# Patient Record
Sex: FEMALE | URBAN - METROPOLITAN AREA
[De-identification: names, ages, dates, MRNs, and addresses within clinical notes are randomized per-mention and may not be internally consistent; named-entity substitution may affect disease eponyms.]

---

## 2024-03-26 ENCOUNTER — LAB REQUISITION (OUTPATIENT)
Dept: LAB | Facility: CLINIC | Age: 29
End: 2024-03-26
Payer: COMMERCIAL

## 2024-03-26 LAB
ALBUMIN SERPL BCG-MCNC: 4.5 G/DL (ref 3.5–5.2)
ALP SERPL-CCNC: 89 U/L (ref 40–150)
ALT SERPL W P-5'-P-CCNC: 20 U/L (ref 0–50)
ANION GAP SERPL CALCULATED.3IONS-SCNC: 13 MMOL/L (ref 7–15)
AST SERPL W P-5'-P-CCNC: 18 U/L (ref 0–45)
BILIRUB SERPL-MCNC: 0.3 MG/DL
BUN SERPL-MCNC: 16.7 MG/DL (ref 6–20)
CALCIUM SERPL-MCNC: 9.5 MG/DL (ref 8.6–10)
CHLORIDE SERPL-SCNC: 104 MMOL/L (ref 98–107)
CREAT SERPL-MCNC: 0.92 MG/DL (ref 0.51–0.95)
DEPRECATED HCO3 PLAS-SCNC: 22 MMOL/L (ref 22–29)
EGFRCR SERPLBLD CKD-EPI 2021: 85 ML/MIN/1.73M2
GLUCOSE SERPL-MCNC: 96 MG/DL (ref 70–99)
POTASSIUM SERPL-SCNC: 4.3 MMOL/L (ref 3.4–5.3)
PROT SERPL-MCNC: 7.6 G/DL (ref 6.4–8.3)
SODIUM SERPL-SCNC: 139 MMOL/L (ref 135–145)
TSH SERPL DL<=0.005 MIU/L-ACNC: 1.24 UIU/ML (ref 0.3–4.2)

## 2024-03-26 PROCEDURE — 84443 ASSAY THYROID STIM HORMONE: CPT | Mod: ORL | Performed by: FAMILY MEDICINE

## 2024-03-26 PROCEDURE — 80053 COMPREHEN METABOLIC PANEL: CPT | Mod: ORL | Performed by: FAMILY MEDICINE

## 2024-09-27 ENCOUNTER — LAB REQUISITION (OUTPATIENT)
Dept: LAB | Facility: CLINIC | Age: 29
End: 2024-09-27
Payer: COMMERCIAL

## 2024-09-27 DIAGNOSIS — Z13.1 ENCOUNTER FOR SCREENING FOR DIABETES MELLITUS: ICD-10-CM

## 2024-09-27 LAB
EST. AVERAGE GLUCOSE BLD GHB EST-MCNC: 108 MG/DL
FASTING STATUS PATIENT QL REPORTED: YES
GLUCOSE SERPL-MCNC: 92 MG/DL (ref 70–99)
HBA1C MFR BLD: 5.4 %

## 2024-09-27 PROCEDURE — 83036 HEMOGLOBIN GLYCOSYLATED A1C: CPT | Mod: ORL | Performed by: OBSTETRICS & GYNECOLOGY

## 2024-09-27 PROCEDURE — 82947 ASSAY GLUCOSE BLOOD QUANT: CPT | Mod: ORL | Performed by: OBSTETRICS & GYNECOLOGY

## 2025-07-24 ENCOUNTER — HOSPITAL ENCOUNTER (EMERGENCY)
Facility: HOSPITAL | Age: 30
Discharge: HOME | End: 2025-07-24
Attending: EMERGENCY MEDICINE
Payer: COMMERCIAL

## 2025-07-24 ENCOUNTER — APPOINTMENT (OUTPATIENT)
Dept: ULTRASOUND IMAGING | Facility: HOSPITAL | Age: 30
End: 2025-07-24
Attending: EMERGENCY MEDICINE
Payer: COMMERCIAL

## 2025-07-24 VITALS
RESPIRATION RATE: 16 BRPM | SYSTOLIC BLOOD PRESSURE: 115 MMHG | OXYGEN SATURATION: 99 % | HEART RATE: 82 BPM | DIASTOLIC BLOOD PRESSURE: 75 MMHG | HEIGHT: 65 IN | BODY MASS INDEX: 30.49 KG/M2 | WEIGHT: 183 LBS | TEMPERATURE: 98.1 F

## 2025-07-24 DIAGNOSIS — N93.9 VAGINAL BLEEDING: ICD-10-CM

## 2025-07-24 PROBLEM — Z97.5 PRESENCE OF INTRAUTERINE CONTRACEPTIVE DEVICE: Status: ACTIVE | Noted: 2025-01-02

## 2025-07-24 LAB
ABO + RH BLD: NORMAL
ALBUMIN SERPL BCG-MCNC: 4.4 G/DL (ref 3.5–5.2)
ALP SERPL-CCNC: 80 U/L (ref 40–150)
ALT SERPL W P-5'-P-CCNC: 32 U/L (ref 0–50)
ANION GAP SERPL CALCULATED.3IONS-SCNC: 11 MMOL/L (ref 7–15)
AST SERPL W P-5'-P-CCNC: 26 U/L (ref 0–45)
BILIRUB SERPL-MCNC: 0.3 MG/DL
BLD GP AB SCN SERPL QL: NEGATIVE
BUN SERPL-MCNC: 16.9 MG/DL (ref 6–20)
CALCIUM SERPL-MCNC: 9.7 MG/DL (ref 8.8–10.4)
CHLORIDE SERPL-SCNC: 103 MMOL/L (ref 98–107)
CREAT SERPL-MCNC: 0.74 MG/DL (ref 0.51–0.95)
EGFRCR SERPLBLD CKD-EPI 2021: >90 ML/MIN/1.73M2
ERYTHROCYTE [DISTWIDTH] IN BLOOD BY AUTOMATED COUNT: 13.4 % (ref 10–15)
GLUCOSE SERPL-MCNC: 90 MG/DL (ref 70–99)
HCG SERPL QL: NEGATIVE
HCO3 SERPL-SCNC: 29 MMOL/L (ref 22–29)
HCT VFR BLD AUTO: 39.1 % (ref 35–47)
HGB BLD-MCNC: 13.2 G/DL (ref 11.7–15.7)
INR PPP: 1 (ref 0.85–1.15)
MCH RBC QN AUTO: 27.8 PG (ref 26.5–33)
MCHC RBC AUTO-ENTMCNC: 33.8 G/DL (ref 31.5–36.5)
MCV RBC AUTO: 82 FL (ref 78–100)
PLATELET # BLD AUTO: 215 10E3/UL (ref 150–450)
POTASSIUM SERPL-SCNC: 3.9 MMOL/L (ref 3.4–5.3)
PROT SERPL-MCNC: 7.7 G/DL (ref 6.4–8.3)
PROTHROMBIN TIME: 13.4 SECONDS (ref 11.8–14.8)
RBC # BLD AUTO: 4.75 10E6/UL (ref 3.8–5.2)
SODIUM SERPL-SCNC: 143 MMOL/L (ref 135–145)
SPECIMEN EXP DATE BLD: NORMAL
WBC # BLD AUTO: 6.7 10E3/UL (ref 4–11)

## 2025-07-24 PROCEDURE — 85014 HEMATOCRIT: CPT | Performed by: PHYSICIAN ASSISTANT

## 2025-07-24 PROCEDURE — 250N000011 HC RX IP 250 OP 636: Performed by: EMERGENCY MEDICINE

## 2025-07-24 PROCEDURE — 36415 COLL VENOUS BLD VENIPUNCTURE: CPT | Performed by: PHYSICIAN ASSISTANT

## 2025-07-24 PROCEDURE — 99285 EMERGENCY DEPT VISIT HI MDM: CPT | Mod: 25 | Performed by: EMERGENCY MEDICINE

## 2025-07-24 PROCEDURE — 96374 THER/PROPH/DIAG INJ IV PUSH: CPT

## 2025-07-24 PROCEDURE — 84460 ALANINE AMINO (ALT) (SGPT): CPT | Performed by: EMERGENCY MEDICINE

## 2025-07-24 PROCEDURE — 258N000003 HC RX IP 258 OP 636: Performed by: EMERGENCY MEDICINE

## 2025-07-24 PROCEDURE — 76830 TRANSVAGINAL US NON-OB: CPT

## 2025-07-24 PROCEDURE — 85610 PROTHROMBIN TIME: CPT | Performed by: EMERGENCY MEDICINE

## 2025-07-24 PROCEDURE — 96361 HYDRATE IV INFUSION ADD-ON: CPT

## 2025-07-24 PROCEDURE — 86900 BLOOD TYPING SEROLOGIC ABO: CPT | Performed by: PHYSICIAN ASSISTANT

## 2025-07-24 PROCEDURE — 84703 CHORIONIC GONADOTROPIN ASSAY: CPT | Performed by: EMERGENCY MEDICINE

## 2025-07-24 RX ORDER — KETOROLAC TROMETHAMINE 15 MG/ML
15 INJECTION, SOLUTION INTRAMUSCULAR; INTRAVENOUS ONCE
Status: COMPLETED | OUTPATIENT
Start: 2025-07-24 | End: 2025-07-24

## 2025-07-24 RX ORDER — ALPRAZOLAM 1 MG/1
TABLET ORAL
COMMUNITY
Start: 2024-10-11

## 2025-07-24 RX ADMIN — KETOROLAC TROMETHAMINE 15 MG: 15 INJECTION, SOLUTION INTRAMUSCULAR; INTRAVENOUS at 12:53

## 2025-07-24 RX ADMIN — SODIUM CHLORIDE, SODIUM LACTATE, POTASSIUM CHLORIDE, AND CALCIUM CHLORIDE 1000 ML: .6; .31; .03; .02 INJECTION, SOLUTION INTRAVENOUS at 11:09

## 2025-07-24 ASSESSMENT — COLUMBIA-SUICIDE SEVERITY RATING SCALE - C-SSRS
1. IN THE PAST MONTH, HAVE YOU WISHED YOU WERE DEAD OR WISHED YOU COULD GO TO SLEEP AND NOT WAKE UP?: NO
2. HAVE YOU ACTUALLY HAD ANY THOUGHTS OF KILLING YOURSELF IN THE PAST MONTH?: NO
6. HAVE YOU EVER DONE ANYTHING, STARTED TO DO ANYTHING, OR PREPARED TO DO ANYTHING TO END YOUR LIFE?: NO

## 2025-07-24 ASSESSMENT — ACTIVITIES OF DAILY LIVING (ADL)
ADLS_ACUITY_SCORE: 41

## 2025-07-24 NOTE — ED TRIAGE NOTES
Patient stated has been having vaginal bleeding started 7/11/2025, Mirena IUD was removed 7/21 and bleeding got worse.  Patient c/o headache and light head.      Triage Assessment (Adult)       Row Name 07/24/25 1021          Triage Assessment    Airway WDL WDL        Respiratory WDL    Respiratory WDL WDL        Skin Circulation/Temperature WDL    Skin Circulation/Temperature WDL WDL        Cardiac WDL    Cardiac WDL WDL        Peripheral/Neurovascular WDL    Peripheral Neurovascular WDL WDL        Cognitive/Neuro/Behavioral WDL    Cognitive/Neuro/Behavioral WDL WDL

## 2025-07-24 NOTE — ED PROVIDER NOTES
EMERGENCY DEPARTMENT ENCOUNTER      NAME: Agustina Asif  AGE: 30 year old female  YOB: 1995  MRN: [id number]  EVALUATION DATE & TIME: 7/24/2025 10:53 AM    PCP: Kalen Jerez    ED PROVIDER: Amos Miller M.D.      Chief Complaint   Patient presents with    Vaginal Bleeding         IMPRESSION  1. Vaginal bleeding        PLAN  - ibuprofen 800mg TID x3, then PRN after that  - close PCP & OB/Gyn follow up  - discharge to home    ED COURSE & MEDICAL DECISION MAKING    ED Course as of 07/24/25 1322   Thu Jul 24, 2025   1051 Patient seen in the waiting room due to boarding crisis.   1230 Pelvic US independently reviewed & interpreted by me: no fibroid, no blood products in uterus.   1257 30yoF who had Mirena IUD placed 9 months ago; has had vaginal dryness & pelvic cramping since. Started mild vaginal bleeding on 7/11 and had IUD removed on 7/21. Reports increased bleeding since then with mild clots. Today awoke in the morning and passed large egg-sized clot. Mild pelvic cramping. Thus came to the ED. No ongoing bleeding since arrival.    HR 100s on presentation (80s during exam) with otherwise normal vitals. Exam with no abdominal tenderness, no peritoneal signs, no CVA tenderness, clear lungs, normal work of breathing, no rash or petechiae.    Labs unremarkable with no anemia, normal platelets, negative pregnancy. US with no acute findings either.    Suspect nonspecific vaginal bleeding after IUD removal. Patient comfortable with OTC ibuprofen and follow up with OB/Gyn clinic; reasonable to me. Prefers not to start OCPs or Lysteda here now; reasonable given no ongoing notable bleeding. Patient able to tolerate PO and walk without difficulty. Patient comfortable with discharge at this time. Return precautions and need for PCP & OB/Gyn follow up discussed and understood. No further questions at the time of discharge.  "      --------------------------------------------------------------------------------   --------------------------------------------------------------------------------   ED course timestamps entered by medical scribe:  12:42 PM I met with the patient for the initial interview and physical examination. Discussed plan for treatment and workup in the ED.      --------------------------------------------------------------------------------  --------------------------------------------------------------------------------   This patient involved a high degree of complexity in medical decision making, as significant risks were present and assessed. Recent encounters & results in medical record reviewed by me.    All workup (i.e. any EKG/labs/imaging as per charting below) reviewed and independently interpreted by me. See respective sections for details.    Broad differential considered for this patient, including but not limited to:  dysfunctional uterine bleeding, uterus perforation, anemia, bleeding diathesis, pregnancy, STD, fibroid      See additional MDM below if interested.      MEDICATIONS GIVEN IN THE EMERGENCY DEPARTMENT  Medications   lactated ringers BOLUS 1,000 mL (0 mLs Intravenous Stopped 7/24/25 7379)   ketorolac (TORADOL) injection 15 mg (15 mg Intravenous $Given 7/24/25 1927)       NEW PRESCRIPTIONS STARTED AT TODAY'S ER VISIT  Discharge Medication List as of 7/24/2025 12:59 PM        CONTINUE these medications which have NOT CHANGED    Details   ALPRAZolam (XANAX) 1 MG tablet Historical                 =================================================================      HPI  Use of : N/A       Agustina PEÑA Tristin Asif is a 30 year old female with a pertinent history of polycystic ovarian syndrome who presents to this ED by private vehicle for evaluation of vaginal bleeding.    Patient came to ED following heavy vaginal bleeding and the vaginal passing of an \"egg sized clot\". Bleeding began " on 7/11/2025 (13 days ago), which she associated with her typical period, but has continued ever since. In the days leading up to today, she reported heavy vaginal bleeding and blood clots. She describes having cramping pain in hips and back, in addition to typical menstrual cramping. As of 12:47 PM, patient reports a decrease in symptoms and denies ongoing bleeding. It is worth noting that patient had her Mirena IUD removed 7/21/2025 (~4 days ago) due to adverse reactions. Her Mirena IUD was placed in October 2024. Since placement, patient has experienced vaginal dryness, itchiness, and irregular periods. She has since been treated for a Candida overgrowth, but says that once overgrowth was cleared, symptoms persisted.     Per chart review, patient saw her OB/GYN provider, Medina Stover CNM, on 6/17/2025 (~7 days ago) to address vulvar itching. She was vaginally swabbed and tested positive for Candida Species, and prescribed flucanozole 200 mg po daily. Chart review also indicates that patient has tried several methods to address other vaginal itching, with no success.     Patient also saw her OB/GYN provider, Medina Stover CNM, on 6/21/2025 (~3 days ago) to remove her IUD, which occurred with no difficulties or complications. Patient prescribed triamcinolone 0.1% ointment to apply topically 3x daily for vulvar itching.               --------------- MEDICAL HISTORY ---------------  PAST MEDICAL HISTORY:  Reviewed independently by me.  No past medical history on file.  Patient Active Problem List   Diagnosis    Presence of intrauterine contraceptive device       PAST SURGICAL HISTORY:  Reviewed independently by me.  No past surgical history on file.    CURRENT MEDICATIONS:    Reviewed independently by me.  No current facility-administered medications for this encounter.    Current Outpatient Medications:     ALPRAZolam (XANAX) 1 MG tablet, , Disp: , Rfl:     ALLERGIES:  Reviewed independently by  "me.  No Known Allergies    FAMILY HISTORY:  Reviewed independently by me.  No family history on file.      SOCIAL HISTORY:   Reviewed independently by me.  Social History     Socioeconomic History    Marital status:      Social Drivers of Health     Financial Resource Strain: Low Risk  (1/28/2025)    Received [facility]    Financial Resource Strain     Difficulty of Paying Living Expenses: 3   Food Insecurity: No Food Insecurity (8/28/2024)    Received from [facility]    Food Insecurity     Do you worry your food will run out before you are able to buy more?: 1   Transportation Needs: No Transportation Needs (8/28/2024)    Received from [facility]    Transportation Needs     Does lack of transportation keep you from medical appointments?: 1     Does lack of transportation keep you from work, meetings or getting things that you need?: 1   Social Connections: Socially Integrated (8/28/2024)    Received from [facility]    Social Connections     Do you often feel lonely or isolated from those around you?: 0   Housing Stability: Low Risk  (8/28/2024)    [facility]    Housing Stability     What is your housing situation today?: 1       --------------- PHYSICAL EXAM ---------------  Nursing notes and vitals independently reviewed by me.  VITALS:  Vitals:    07/24/25 1020 07/24/25 1256 07/24/25 1300   BP: 117/74 120/74 115/75   Pulse: 106 84 82   Resp: 16     Temp: 98.1  F (36.7  C)     TempSrc: Oral     SpO2: 98% 98% 99%   Weight: 83 kg (183 lb)     Height: 1.651 m (5' 5\")         PHYSICAL EXAM:    General:  alert, interactive, no distress  Eyes:  conjunctivae clear, conjugate gaze  HENT:  atraumatic, nose with no rhinorrhea, oropharynx clear  Neck:  no meningismus  Cardiovascular:  HR 70s during exam, regular rhythm, no murmurs, " brisk cap refill  Chest:  no chest wall tenderness  Pulmonary:  no stridor, normal phonation, normal work of breathing, clear lungs bilaterally  Abdomen:  soft, nondistended, nontender  :  no CVA tenderness  Back:  no midline spinal tenderness  Musculoskeletal:  no pretibial edema, no calf tenderness. Gross ROM intact to joints of extremities with no obvious deformities.  Skin:  warm, dry, no rash  Neuro:  awake, alert, answers questions appropriately, follows commands, moves all limbs  Psych:  calm, normal affect      --------------- RESULTS ---------------  LAB:  Reviewed and independently interpreted by me.  Results for orders placed or performed during the hospital encounter of 07/24/25   US Pelvic Complete w Transvaginal    Impression    IMPRESSION:    1.  Normal pelvic ultrasound.   CBC with Platelets (Limited Occurrences)   Result Value Ref Range    WBC Count 6.7 4.0 - 11.0 10e3/uL    RBC Count 4.75 3.80 - 5.20 10e6/uL    Hemoglobin 13.2 11.7 - 15.7 g/dL    Hematocrit 39.1 35.0 - 47.0 %    MCV 82 78 - 100 fL    MCH 27.8 26.5 - 33.0 pg    MCHC 33.8 31.5 - 36.5 g/dL    RDW 13.4 10.0 - 15.0 %    Platelet Count 215 150 - 450 10e3/uL   hCG Qualitative Pregnancy   Result Value Ref Range    hCG Serum Qualitative Negative Negative   Comprehensive Metabolic Panel (Limited Occurrences)   Result Value Ref Range    Sodium 143 135 - 145 mmol/L    Potassium 3.9 3.4 - 5.3 mmol/L    Carbon Dioxide (CO2) 29 22 - 29 mmol/L    Anion Gap 11 7 - 15 mmol/L    Urea Nitrogen 16.9 6.0 - 20.0 mg/dL    Creatinine 0.74 0.51 - 0.95 mg/dL    GFR Estimate >90 >60 mL/min/1.73m2    Calcium 9.7 8.8 - 10.4 mg/dL    Chloride 103 98 - 107 mmol/L    Glucose 90 70 - 99 mg/dL    Alkaline Phosphatase 80 40 - 150 U/L    AST 26 0 - 45 U/L    ALT 32 0 - 50 U/L    Protein Total 7.7 6.4 - 8.3 g/dL    Albumin 4.4 3.5 - 5.2 g/dL    Bilirubin Total 0.3 <=1.2 mg/dL   INR   Result Value Ref Range    INR 1.00 0.85 - 1.15    PT 13.4 11.8 - 14.8 Seconds    Adult Type and Screen   Result Value Ref Range    ABO/RH(D) O POS     Antibody Screen Negative Negative    SPECIMEN EXPIRATION DATE 7/27/2025 11:59:00 PM CDT        RADIOLOGY:  Reviewed and independently interpreted by me. Please see official radiology report.  Recent Results (from the past 24 hours)   US Pelvic Complete w Transvaginal    Narrative    EXAM: US PELVIC TRANSABDOMINAL AND TRANSVAGINAL  LOCATION: [facility]  DATE: 7/24/2025    INDICATION: Ongoing bleeding.  COMPARISON: None.  TECHNIQUE: Transabdominal scans were performed. Endovaginal ultrasound was performed to better visualize the adnexa.    FINDINGS:    UTERUS: 7.7 x 4.5 x 3.2 cm. Anteverted position. No discrete fibroids. Tiny cervical nabothian cysts.    ENDOMETRIUM: 4 mm. Normal smooth endometrium. No focal endometrial lesions.    RIGHT OVARY: 3.2 x 2.0 x 1.9 cm. Normal.    LEFT OVARY: 2.3 x 1.6 x 1.9 cm. Normal.    Trace free fluid within the pelvic cul-de-sac.      Impression    IMPRESSION:    1.  Normal pelvic ultrasound.                     --------------- ADDITIONAL MDM ---------------  Severe Sepsis/Septic Shock/STEMI/Stroke Measures:  None    MIPS (CTPE, dental pain, Herman, sinusitis, asthma/COPD, head trauma):  Not Applicable    History:  - I considered systemic symptoms of the presenting illness.  - Supplemental history from:       -- patient  - External Record(s) reviewed:       -- Inpatient/outpatient record (clinic visit 7/21/25), prior labs (swab 7/21/25), prior imaging (Ob US 6/23/23)       -- see above ED course & MDM for further details    Workup:  - Chart documentation above includes differential considered and my independent interpretation any EKGs, labs tests, and/or imaging  - emergent/severe conditions considered and evaluated for: see above differential & MDM  - In additional to work up documented, I considered the following work up:       -- wet prep       -- see above ED course & MDM for further  details    Independent Interpretation:  - Independent interpretation of ECG and images noted in documentation, when applicable.    External Consultation:  - Discussion of management with another provider:       -- ED pharmacist re: meds       -- see above ED course & MDM for additional    Complicating Factors:  - Care impacted by chronic illness:       -- see above MDM, past medical history, & problem list    Disposition Considerations:  - Discharge       -- I considered escalation of care with admission to the hospital, but ultimately discharged the patient given reassuring workup & exam, comfortable with discharge       -- I recommended the patient continue their current prescription strength medication(s) as charted above in current medications list       -- I considered prescription OCPs or TXA, patient comfortable without this and following up with OB/Gyn clinic instead       -- I recommended over-the-counter medication(s) as charted above & in discharge instructions           I, Nelly Dick, am serving as a scribe to document services personally performed by Dr. Amos Miller based on my observation and the provider's statements to me. I, Amos Miller MD attest that Nelly Dick is acting in a scribe capacity, has observed my performance of the services and has documented them in accordance with my direction.      Amos Miller MD  07/24/25  Emergency Medicine  [facility] EMERGENCY DEPARTMENT  [address] [place] [id number] [phone]  Dept: [phone]     Amos Miller MD  07/24/25 8712

## 2025-07-24 NOTE — DISCHARGE INSTRUCTIONS
For the next 3 days, take:  - over-the-counter ibuprofen 800mg by mouth every 8 hours (max dose 2400mg in 24 hours)    This can help with mild vaginal bleeding. Take the ibuprofen as needed after that.    Call your OB/Gyn clinic to arrange follow up with them.    Return to the Emergency Department for any severe worsening or any other concerns.

## 2025-07-25 ENCOUNTER — LAB REQUISITION (OUTPATIENT)
Dept: LAB | Facility: CLINIC | Age: 30
End: 2025-07-25
Payer: COMMERCIAL

## 2025-07-25 DIAGNOSIS — J02.9 ACUTE PHARYNGITIS, UNSPECIFIED: ICD-10-CM

## 2025-07-25 PROCEDURE — 87077 CULTURE AEROBIC IDENTIFY: CPT | Mod: ORL | Performed by: PHYSICIAN ASSISTANT

## 2025-07-27 LAB — BACTERIA SPEC CULT: ABNORMAL

## 2025-07-28 ENCOUNTER — NURSE TRIAGE (OUTPATIENT)
Dept: FAMILY MEDICINE | Facility: CLINIC | Age: 30
End: 2025-07-28
Payer: COMMERCIAL

## 2025-07-28 NOTE — TELEPHONE ENCOUNTER
"Nurse Triage SBAR    Is this a 2nd Level Triage? NO    Situation: Incoming call from patient to report she passed a large clot \"the size of an egg\" after waking up this morning. She had her IUD removed on 7/21/25 and has been experiencing bleeding since. She was seen at [facility] ED on 7/24/25 and discharged home. She is having bleeding throughout the day and states she doesn't have to change her pad very often. Denies abdominal pain or cramping. Does report some minimal light headedness that has been present since her IUD was removed and has not worsened. Labs checked in ED with HGB of 13.2.       Background: ED 7/24  PLAN  - ibuprofen 800mg TID x3, then PRN after that  - close PCP & OB/Gyn follow up  - discharge to home    Assessment: See above.    Protocol Recommended Disposition:   See in Office Within 3 Days    Recommendation: She has a follow-up scheduled with her OB/Gyn on Thursday for follow-up. Educated on red flag symptoms and when to seek emergency care. She stated understanding and had no further questions.     Does the patient meet one of the following criteria for ADS visit consideration? No    Reason for Disposition   Bleeding or spotting after procedure (e.g., biopsy) or pelvic examination (e.g., pap smear) that lasts > 7 days    Additional Information   Negative: SEVERE vaginal bleeding (e.g., continuous red blood from vagina, or large blood clots) and very weak (can't stand)   Negative: Passed out (e.g., fainted, lost consciousness, blacked out and was not responding)   Negative: Difficult to awaken or acting confused (e.g., disoriented, slurred speech)   Negative: Shock suspected (e.g., cold/pale/clammy skin, too weak to stand, low BP, rapid pulse)   Negative: Sounds like a life-threatening emergency to the triager   Negative: Pregnant 20 or more weeks (5 months or more)   Negative: Pregnant < 20 weeks (less than 5 months)   Negative: Postpartum (from 0 to 6 weeks after delivery)   Negative: " Vaginal discharge is main symptom and bleeding is slight   Negative: SEVERE abdominal pain (e.g., excruciating)   Negative: SEVERE dizziness (e.g., unable to stand, requires support to walk, feels like passing out now)   Negative: SEVERE vaginal bleeding (e.g., soaking 2 pads or tampons per hour and present 2 or more hours; 1 menstrual cup every 2 hours)   Negative: Patient sounds very sick or weak to the triager   Negative: MODERATE vaginal bleeding (i.e., soaking pad or tampon per hour and present > 6 hours; 1 menstrual cup every 6 hours)   Negative: Constant abdominal pain lasting > 2 hours   Negative: Pale skin (pallor) of new-onset or getting worse   Negative: Taking Coumadin (warfarin) or other strong blood thinner, or known bleeding disorder (e.g., thrombocytopenia)   Negative: Skin bruises or nosebleed and not caused by an injury   Negative: Patient wants to be seen   Negative: Passed tissue (e.g., gray-white)    Protocols used: Vaginal Bleeding - [id number]

## 2025-08-10 ENCOUNTER — HEALTH MAINTENANCE LETTER (OUTPATIENT)
Age: 30
End: 2025-08-10